# Patient Record
Sex: MALE | Race: WHITE | NOT HISPANIC OR LATINO | Employment: OTHER | URBAN - METROPOLITAN AREA
[De-identification: names, ages, dates, MRNs, and addresses within clinical notes are randomized per-mention and may not be internally consistent; named-entity substitution may affect disease eponyms.]

---

## 2018-06-10 ENCOUNTER — OFFICE VISIT (OUTPATIENT)
Dept: URGENT CARE | Facility: CLINIC | Age: 58
End: 2018-06-10

## 2018-06-10 VITALS
DIASTOLIC BLOOD PRESSURE: 76 MMHG | TEMPERATURE: 98 F | HEART RATE: 69 BPM | SYSTOLIC BLOOD PRESSURE: 116 MMHG | HEIGHT: 72 IN | OXYGEN SATURATION: 96 % | WEIGHT: 200 LBS | RESPIRATION RATE: 17 BRPM | BODY MASS INDEX: 27.09 KG/M2

## 2018-06-10 DIAGNOSIS — T78.40XA ALLERGIC REACTION, INITIAL ENCOUNTER: ICD-10-CM

## 2018-06-10 DIAGNOSIS — Z71.6 ENCOUNTER FOR SMOKING CESSATION COUNSELING: ICD-10-CM

## 2018-06-10 DIAGNOSIS — H10.31 ACUTE CONJUNCTIVITIS OF RIGHT EYE, UNSPECIFIED ACUTE CONJUNCTIVITIS TYPE: Primary | ICD-10-CM

## 2018-06-10 PROCEDURE — 99203 OFFICE O/P NEW LOW 30 MIN: CPT | Mod: 25,S$GLB,, | Performed by: NURSE PRACTITIONER

## 2018-06-10 PROCEDURE — 96372 THER/PROPH/DIAG INJ SC/IM: CPT | Mod: S$GLB,,, | Performed by: NURSE PRACTITIONER

## 2018-06-10 RX ORDER — ERYTHROMYCIN 5 MG/G
OINTMENT OPHTHALMIC NIGHTLY
Qty: 1 TUBE | Refills: 0 | Status: SHIPPED | OUTPATIENT
Start: 2018-06-10 | End: 2018-06-17

## 2018-06-10 RX ORDER — POLYMYXIN B SULFATE AND TRIMETHOPRIM 1; 10000 MG/ML; [USP'U]/ML
1 SOLUTION OPHTHALMIC EVERY 6 HOURS
Qty: 10 ML | Refills: 0 | Status: SHIPPED | OUTPATIENT
Start: 2018-06-10

## 2018-06-10 RX ORDER — BETAMETHASONE SODIUM PHOSPHATE AND BETAMETHASONE ACETATE 3; 3 MG/ML; MG/ML
6 INJECTION, SUSPENSION INTRA-ARTICULAR; INTRALESIONAL; INTRAMUSCULAR; SOFT TISSUE
Status: COMPLETED | OUTPATIENT
Start: 2018-06-10 | End: 2018-06-10

## 2018-06-10 RX ADMIN — BETAMETHASONE SODIUM PHOSPHATE AND BETAMETHASONE ACETATE 6 MG: 3; 3 INJECTION, SUSPENSION INTRA-ARTICULAR; INTRALESIONAL; INTRAMUSCULAR; SOFT TISSUE at 04:06

## 2018-06-10 NOTE — PROGRESS NOTES
"Subjective:       Patient ID: Butch Alan is a 58 y.o. male.    Vitals:  height is 6' (1.829 m) and weight is 90.7 kg (200 lb). His oral temperature is 97.8 °F (36.6 °C). His blood pressure is 116/76 and his pulse is 69. His respiration is 17 and oxygen saturation is 96%.     Chief Complaint: Eye Problem and Sore Throat    Pt c/o itchy eyes and throat after holding a cat and sleeping in same bed as cat, + known allergy to cats. Pt also c/o "pink eye" to right eye, states he recently had it in his left eye and it has now spread. + yellow discharge and itching to right eye x2 days.      Eye Problem    The right eye is affected. This is a new problem. The current episode started yesterday. The problem occurs constantly. The problem has been gradually worsening. There was no injury mechanism. The pain is at a severity of 3/10. The pain is mild. There is no known exposure to pink eye. He does not wear contacts. Associated symptoms include an eye discharge, eye redness and itching. Pertinent negatives include no blurred vision, double vision, fever, foreign body sensation, nausea, photophobia, recent URI or vomiting. He has tried eye drops for the symptoms. The treatment provided mild relief.   Sore Throat    Pertinent negatives include no congestion, headaches or vomiting.     Review of Systems   Constitution: Negative for chills and fever.   HENT: Positive for sore throat. Negative for congestion.    Eyes: Positive for discharge, itching, pain and redness. Negative for blurred vision, double vision, photophobia and vision loss in right eye.   Gastrointestinal: Negative for nausea and vomiting.   Neurological: Negative for headaches.   All other systems reviewed and are negative.      Objective:      Physical Exam   Constitutional: He is oriented to person, place, and time. He appears well-developed and well-nourished.   HENT:   Head: Normocephalic and atraumatic.   Right Ear: External ear normal.   Left Ear: External " ear normal.   Nose: Nose normal.   Mouth/Throat: Oropharynx is clear and moist.   Eyes: EOM and lids are normal. Pupils are equal, round, and reactive to light. Right eye exhibits discharge and exudate. Right eye exhibits no chemosis and no hordeolum. No foreign body present in the right eye. Right conjunctiva is injected. Right conjunctiva has no hemorrhage. No scleral icterus.       Neck: Trachea normal, full passive range of motion without pain and phonation normal. Neck supple.   Musculoskeletal: Normal range of motion.   Neurological: He is alert and oriented to person, place, and time.   Skin: Skin is warm, dry and intact.   Psychiatric: He has a normal mood and affect. His speech is normal and behavior is normal. Judgment and thought content normal. Cognition and memory are normal.   Nursing note and vitals reviewed.      Assessment:       1. Acute conjunctivitis of right eye, unspecified acute conjunctivitis type    2. Allergic reaction, initial encounter    3. Encounter for smoking cessation counseling        Plan:         Acute conjunctivitis of right eye, unspecified acute conjunctivitis type  -     polymyxin B sulf-trimethoprim (POLYTRIM) 10,000 unit- 1 mg/mL Drop; Place 1 drop into the right eye every 6 (six) hours.  Dispense: 10 mL; Refill: 0  -     erythromycin (ROMYCIN) ophthalmic ointment; Place into the right eye every evening.  Dispense: 1 Tube; Refill: 0    Allergic reaction, initial encounter  -     betamethasone acetate-betamethasone sodium phosphate injection 6 mg; Inject 1 mL (6 mg total) into the muscle one time.    Encounter for smoking cessation counseling  -     Ambulatory referral to Smoking Cessation Program

## 2018-06-10 NOTE — PATIENT INSTRUCTIONS
Conjunctivitis, Allergic    Conjunctivitis is an irritation of a thin membrane in the eye. This membrane is called the conjunctiva. It covers the white of the eye and the inside of the eyelid. The condition is often known as pink eye or red eye because the eye looks pink or red. The eye can also be swollen. A thick fluid may leak from the eyelid. The eye may itch and burn, and feel gritty or scratchy.  Allergic conjunctivitis is caused by an allergen. Allergens are substances that cause the body to react with certain symptoms. Allergens that cause eye irritation include things such as house dust or pollen in the air. This can occur seasonally, most often in the spring.  Home care  · Eye drops may be prescribed to reduce itching and redness. Use these as directed. Otherwise, over-the-counter decongestant eye drops may be used.  · Apply a cool compress (towel soaked in cool water) to the affected eye 3 to 4 times a day to reduce swelling and itching.  · It is common to have mucus drainage during the night, causing the eyelids to become crusted by morning. Use a warm, wet cloth to wipe this away. You may also use saline irrigating solution or artificial tears to rinse away mucus in the eye. Do not patch the eye.  · You may use acetaminophen or ibuprofen to control pain, unless another medicine was prescribed. (Note: If you have chronic liver or kidney disease, or if you have ever had a stomach ulcer or gastrointestinal bleeding, talk with your healthcare provider before using these medicines.)  · Do not wear contact lenses until your eyes have healed and all symptoms are gone.  Follow-up care  Follow up with your healthcare provider, or as advised.  When to seek medical advice  Call your healthcare provider right away if any of these occur:  · Increased eyelid swelling  · New or worsening drainage from the eye  · Increasing redness around the eye  · Facial swelling  Date Last Reviewed: 6/14/2015  © 0812-8000 The  [employer]. [address] [place] [address], [place] [address]. All rights reserved. This information is not intended as a substitute for professional medical care. Always follow your healthcare professional's instructions.      Controlling Allergens: Pets  Constant exposure to allergens means constant allergy symptoms. Thats why controlling or avoiding the allergens that cause your symptoms is an important part of your treatment. If you are allergic to pets, the tips below may help lessen your exposure.     If fur or feathers cause allergies, a fish can be a good pet choice.   Pet allergies  Many people think that pet allergy is caused by the fur of cats and dogs. But researchers have found that the major allergens are proteins made by oil glands in the animals skin. These proteins are shed in flakes of skin called dander. Allergy-causing proteins in saliva stick to the fur when the animal cleans itself. And urine contains allergy-causing proteins. Cats tend to be more likely than dogs to cause allergic reactions--this may be because they lick themselves more, may be held more, and may spend more time indoors. Guinea pigs, mice, and rats can also cause allergies.  Controlling animal allergens  The best way to avoid animal allergens is to not have a pet. If you already have a pet and want to keep it, try to reduce your exposure as much as possible. These tips may help:  · Whenever possible, keep pets outdoors.  This doesn't keep pet dander from getting into your home on clothing or shoes.  · Never let pets into your bedroom or on your bed.  · Try to keep pets off sofas, chairs, rugs, and carpeting. Also, consider removing carpets.  · Use an air-cleaning unit with a HEPA filter--especially in the bedroom.  · Use filter bags or vacuums designed to lessen allergens.  · Wash your hands after you touch a pet, and try to keep pets away from your face.  · Brush and bathe your pet often. Bathing pets helps lessen  dander. Bathing also washes other allergens like dust, mold, and pollen off the animals fur.  Date Last Reviewed: 9/1/2016  © 1977-1575 [employer]. [address] [place] [address], [place] [address]. All rights reserved. This information is not intended as a substitute for professional medical care. Always follow your healthcare professional's instructions.      Please arrange follow up with your primary medical clinic as soon as possible. You must understand that you've received an Urgent Care treatment only and that you may be released before all of your medical problems are known or treated. You, the patient, will arrange for follow up as instructed. If your symptoms worsen or fail to improve you should go to the Emergency Room.

## 2024-12-13 ENCOUNTER — OFFICE VISIT (OUTPATIENT)
Dept: URGENT CARE | Facility: CLINIC | Age: 64
End: 2024-12-13
Payer: COMMERCIAL

## 2024-12-13 VITALS
RESPIRATION RATE: 17 BRPM | HEIGHT: 72 IN | OXYGEN SATURATION: 96 % | HEART RATE: 96 BPM | DIASTOLIC BLOOD PRESSURE: 82 MMHG | BODY MASS INDEX: 27.77 KG/M2 | WEIGHT: 205 LBS | TEMPERATURE: 98 F | SYSTOLIC BLOOD PRESSURE: 130 MMHG

## 2024-12-13 DIAGNOSIS — R19.7 DIARRHEA, UNSPECIFIED TYPE: Primary | ICD-10-CM

## 2024-12-13 DIAGNOSIS — R30.0 DYSURIA: ICD-10-CM

## 2024-12-13 RX ORDER — DICYCLOMINE HYDROCHLORIDE 10 MG/1
10 CAPSULE ORAL 3 TIMES DAILY PRN
Qty: 30 CAPSULE | Refills: 0 | Status: SHIPPED | OUTPATIENT
Start: 2024-12-13

## 2024-12-13 RX ORDER — AZITHROMYCIN 500 MG/1
1000 TABLET, FILM COATED ORAL DAILY
Qty: 2 TABLET | Refills: 0 | Status: SHIPPED | OUTPATIENT
Start: 2024-12-13 | End: 2024-12-14

## 2024-12-13 NOTE — PROGRESS NOTES
Subjective:      Patient ID: Butch Alan is a 64 y.o. male.    Vitals:  height is 6' (1.829 m) and weight is 93 kg (205 lb). His oral temperature is 98 °F (36.7 °C). His blood pressure is 130/82 and his pulse is 96. His respiration is 17 and oxygen saturation is 96%.     Chief Complaint: Diarrhea    Patient presents with diarrhea. States its watery. Onset 11 days. Otc meds help   Provider note begins below    Patient reports 11 days of watery diarrhea.  He has had some coworkers that have had similar symptoms there is have improved.  He reports he will have cramping before bowel movement.  He now has 3 watery stools per day.  They are sometimes mixed with blood.  Suspect the blood is from irritation.  Denies any fevers.  Had nausea at the onset of symptoms.  Denies any recent antibiotic use.  Denies any recent travels.  He does work in a restaurant and handles raw fish.    Diarrhea   Associated symptoms include abdominal pain. Pertinent negatives include no coughing or fever.     Constitution: Negative for sweating, fatigue and fever.   Respiratory:  Negative for cough.    Gastrointestinal:  Positive for abdominal pain, nausea, diarrhea and bright red blood in stool.      Objective:     Physical Exam   Constitutional: He is oriented to person, place, and time.   HENT:   Head: Normocephalic and atraumatic.   Cardiovascular: Normal rate.   Pulmonary/Chest: Effort normal. No respiratory distress.   Abdominal: Normal appearance and bowel sounds are normal. He exhibits no distension and no mass. Soft. There is no abdominal tenderness. There is no rebound, no guarding, no left CVA tenderness and no right CVA tenderness. No hernia.   Neurological: He is alert and oriented to person, place, and time.   Skin: Skin is dry.   Psychiatric: His behavior is normal. Mood normal.       Assessment:     1. Diarrhea, unspecified type    2. Dysuria        Plan:   Stool studies   Bentyl for cramping   Azithromycin 1 dose for potential  vibrio diarrhea   Follow up with GI if symptoms persist despite treatment in clinic  Drink plenty of electrolytes and fluids.  Avoid diary products, spicy foods, and greasy foods.  [place] diet.  Go to ER if not urinating or noted blood in stool.           Diarrhea, unspecified type  -     Cancel: POCT Urinalysis(Instrument)  -     Clostridium difficile EIA; Future; Expected date: 12/13/2024  -     Stool Exam-Ova,Cysts,Parasites; Future; Expected date: 12/13/2024  -     Stool culture; Future; Expected date: 12/13/2024  -     azithromycin (ZITHROMAX) 500 MG tablet; Take 2 tablets (1,000 mg total) by mouth once daily. for 1 day  Dispense: 2 tablet; Refill: 0  -     dicyclomine (BENTYL) 10 MG capsule; Take 1 capsule (10 mg total) by mouth 3 (three) times daily as needed (abd cramps).  Dispense: 30 capsule; Refill: 0    Dysuria

## 2024-12-13 NOTE — PATIENT INSTRUCTIONS
Drink plenty of electrolytes and fluids.  Avoid diary products, spicy foods, and greasy foods.  [place] diet.  Go to ER if not urinating or noted blood in stool.

## 2024-12-14 ENCOUNTER — LAB VISIT (OUTPATIENT)
Dept: LAB | Facility: HOSPITAL | Age: 64
End: 2024-12-14
Payer: COMMERCIAL

## 2024-12-14 DIAGNOSIS — R19.7 DIARRHEA, UNSPECIFIED TYPE: ICD-10-CM

## 2024-12-14 PROCEDURE — 87045 FECES CULTURE AEROBIC BACT: CPT | Performed by: NURSE PRACTITIONER

## 2024-12-14 PROCEDURE — 87449 NOS EACH ORGANISM AG IA: CPT | Performed by: NURSE PRACTITIONER

## 2024-12-14 PROCEDURE — 87046 STOOL CULTR AEROBIC BACT EA: CPT | Performed by: NURSE PRACTITIONER

## 2024-12-14 PROCEDURE — 87427 SHIGA-LIKE TOXIN AG IA: CPT | Performed by: NURSE PRACTITIONER

## 2024-12-14 PROCEDURE — 87449 NOS EACH ORGANISM AG IA: CPT | Mod: 91 | Performed by: NURSE PRACTITIONER

## 2024-12-15 LAB
C DIFF GDH STL QL: NEGATIVE
C DIFF TOX A+B STL QL IA: NEGATIVE

## 2024-12-16 LAB
E COLI SXT1 STL QL IA: NEGATIVE
E COLI SXT2 STL QL IA: NEGATIVE

## 2024-12-17 LAB — BACTERIA STL CULT: NORMAL

## 2024-12-20 ENCOUNTER — TELEPHONE (OUTPATIENT)
Dept: URGENT CARE | Facility: CLINIC | Age: 64
End: 2024-12-20
Payer: COMMERCIAL

## 2024-12-20 NOTE — TELEPHONE ENCOUNTER
patient called, no answer, message with callback number provided; attempt to provide stool specimen result

## 2024-12-21 ENCOUNTER — TELEPHONE (OUTPATIENT)
Dept: URGENT CARE | Facility: CLINIC | Age: 64
End: 2024-12-21
Payer: COMMERCIAL

## 2024-12-29 ENCOUNTER — TELEPHONE (OUTPATIENT)
Dept: URGENT CARE | Facility: CLINIC | Age: 64
End: 2024-12-29
Payer: COMMERCIAL

## 2025-01-06 ENCOUNTER — TELEPHONE (OUTPATIENT)
Dept: URGENT CARE | Facility: CLINIC | Age: 65
End: 2025-01-06
Payer: COMMERCIAL

## 2025-01-06 NOTE — TELEPHONE ENCOUNTER
Three attempts to contact patient with normal stool studies.  No further change in treatment plan.

## 2025-01-06 NOTE — LETTER
[address] ? [place], [address] ? Phone [phone] ? Fax [phone]          January 6, 2025    CERTIFIED MAIL  RETURN RECEIPT REQUESTED    Butch Alan  [address] [facility] [address]        :    Please phone our office at [phone] so that we may speak to you about the results of the test you had done recently.    We look forward to hearing from you soon.      Sincerely,      [facility]